# Patient Record
Sex: FEMALE | Race: BLACK OR AFRICAN AMERICAN | Employment: FULL TIME | URBAN - METROPOLITAN AREA
[De-identification: names, ages, dates, MRNs, and addresses within clinical notes are randomized per-mention and may not be internally consistent; named-entity substitution may affect disease eponyms.]

---

## 2021-09-03 PROBLEM — D50.9 IRON DEFICIENCY ANEMIA, UNSPECIFIED: Status: ACTIVE | Noted: 2021-09-03

## 2021-09-03 RX ORDER — EPINEPHRINE 1 MG/ML
0.3 INJECTION, SOLUTION, CONCENTRATE INTRAVENOUS AS NEEDED
Status: CANCELLED | OUTPATIENT
Start: 2021-09-08

## 2021-09-03 RX ORDER — DIPHENHYDRAMINE HYDROCHLORIDE 50 MG/ML
25 INJECTION, SOLUTION INTRAMUSCULAR; INTRAVENOUS AS NEEDED
Status: CANCELLED
Start: 2021-09-08

## 2021-09-03 RX ORDER — ACETAMINOPHEN 325 MG/1
650 TABLET ORAL AS NEEDED
Status: CANCELLED
Start: 2021-09-08

## 2021-09-03 RX ORDER — HYDROCORTISONE SODIUM SUCCINATE 100 MG/2ML
100 INJECTION, POWDER, FOR SOLUTION INTRAMUSCULAR; INTRAVENOUS AS NEEDED
Status: CANCELLED | OUTPATIENT
Start: 2021-09-08

## 2021-09-03 RX ORDER — SODIUM CHLORIDE 9 MG/ML
10 INJECTION INTRAMUSCULAR; INTRAVENOUS; SUBCUTANEOUS AS NEEDED
Status: CANCELLED | OUTPATIENT
Start: 2021-09-08

## 2021-09-03 RX ORDER — HEPARIN 100 UNIT/ML
300-500 SYRINGE INTRAVENOUS AS NEEDED
Status: CANCELLED
Start: 2021-09-08

## 2021-09-03 RX ORDER — ONDANSETRON 2 MG/ML
8 INJECTION INTRAMUSCULAR; INTRAVENOUS AS NEEDED
Status: CANCELLED | OUTPATIENT
Start: 2021-09-08

## 2021-09-03 RX ORDER — DIPHENHYDRAMINE HYDROCHLORIDE 50 MG/ML
50 INJECTION, SOLUTION INTRAMUSCULAR; INTRAVENOUS AS NEEDED
Status: CANCELLED
Start: 2021-09-08

## 2021-09-03 RX ORDER — ALBUTEROL SULFATE 0.83 MG/ML
2.5 SOLUTION RESPIRATORY (INHALATION) AS NEEDED
Status: CANCELLED
Start: 2021-09-08

## 2021-09-08 ENCOUNTER — HOSPITAL ENCOUNTER (OUTPATIENT)
Dept: INFUSION THERAPY | Age: 36
Discharge: HOME | End: 2021-09-08
Payer: COMMERCIAL

## 2021-09-08 VITALS
TEMPERATURE: 99.2 F | OXYGEN SATURATION: 90 % | SYSTOLIC BLOOD PRESSURE: 136 MMHG | HEART RATE: 80 BPM | DIASTOLIC BLOOD PRESSURE: 84 MMHG | RESPIRATION RATE: 16 BRPM

## 2021-09-08 DIAGNOSIS — D50.9 IRON DEFICIENCY ANEMIA, UNSPECIFIED IRON DEFICIENCY ANEMIA TYPE: Primary | ICD-10-CM

## 2021-09-08 PROCEDURE — 74011000258 HC RX REV CODE- 258: Performed by: FAMILY MEDICINE

## 2021-09-08 PROCEDURE — 96375 TX/PRO/DX INJ NEW DRUG ADDON: CPT

## 2021-09-08 PROCEDURE — 74011250636 HC RX REV CODE- 250/636: Performed by: NURSE PRACTITIONER

## 2021-09-08 PROCEDURE — 74011250637 HC RX REV CODE- 250/637: Performed by: FAMILY MEDICINE

## 2021-09-08 PROCEDURE — 96366 THER/PROPH/DIAG IV INF ADDON: CPT

## 2021-09-08 PROCEDURE — 96365 THER/PROPH/DIAG IV INF INIT: CPT

## 2021-09-08 PROCEDURE — 74011250636 HC RX REV CODE- 250/636: Performed by: FAMILY MEDICINE

## 2021-09-08 RX ORDER — ACETAMINOPHEN 325 MG/1
650 TABLET ORAL ONCE
Status: CANCELLED
Start: 2021-09-08 | End: 2021-09-08

## 2021-09-08 RX ORDER — HEPARIN 100 UNIT/ML
300-500 SYRINGE INTRAVENOUS AS NEEDED
Status: CANCELLED
Start: 2021-09-08

## 2021-09-08 RX ORDER — ONDANSETRON 2 MG/ML
8 INJECTION INTRAMUSCULAR; INTRAVENOUS AS NEEDED
Status: CANCELLED | OUTPATIENT
Start: 2021-09-08

## 2021-09-08 RX ORDER — ACETAMINOPHEN 325 MG/1
650 TABLET ORAL AS NEEDED
Status: CANCELLED
Start: 2021-09-08

## 2021-09-08 RX ORDER — SODIUM CHLORIDE 0.9 % (FLUSH) 0.9 %
10 SYRINGE (ML) INJECTION AS NEEDED
Status: CANCELLED | OUTPATIENT
Start: 2021-09-08

## 2021-09-08 RX ORDER — DIPHENHYDRAMINE HYDROCHLORIDE 50 MG/ML
25 INJECTION, SOLUTION INTRAMUSCULAR; INTRAVENOUS AS NEEDED
Status: CANCELLED
Start: 2021-09-08

## 2021-09-08 RX ORDER — DIPHENHYDRAMINE HYDROCHLORIDE 50 MG/ML
25 INJECTION, SOLUTION INTRAMUSCULAR; INTRAVENOUS ONCE
Status: COMPLETED | OUTPATIENT
Start: 2021-09-08 | End: 2021-09-08

## 2021-09-08 RX ORDER — ALBUTEROL SULFATE 0.83 MG/ML
2.5 SOLUTION RESPIRATORY (INHALATION) AS NEEDED
Status: CANCELLED
Start: 2021-09-08

## 2021-09-08 RX ORDER — SODIUM CHLORIDE 0.9 % (FLUSH) 0.9 %
10 SYRINGE (ML) INJECTION AS NEEDED
Status: DISPENSED | OUTPATIENT
Start: 2021-09-08 | End: 2021-09-08

## 2021-09-08 RX ORDER — HYDROCORTISONE SODIUM SUCCINATE 100 MG/2ML
100 INJECTION, POWDER, FOR SOLUTION INTRAMUSCULAR; INTRAVENOUS AS NEEDED
Status: CANCELLED | OUTPATIENT
Start: 2021-09-08

## 2021-09-08 RX ORDER — DIPHENHYDRAMINE HYDROCHLORIDE 50 MG/ML
50 INJECTION, SOLUTION INTRAMUSCULAR; INTRAVENOUS AS NEEDED
Status: CANCELLED
Start: 2021-09-08

## 2021-09-08 RX ORDER — SODIUM CHLORIDE 9 MG/ML
10 INJECTION INTRAMUSCULAR; INTRAVENOUS; SUBCUTANEOUS AS NEEDED
Status: CANCELLED | OUTPATIENT
Start: 2021-09-08

## 2021-09-08 RX ORDER — SODIUM CHLORIDE 9 MG/ML
25 INJECTION, SOLUTION INTRAVENOUS CONTINUOUS
Status: CANCELLED | OUTPATIENT
Start: 2021-09-08

## 2021-09-08 RX ORDER — ACETAMINOPHEN 325 MG/1
650 TABLET ORAL ONCE
Status: COMPLETED | OUTPATIENT
Start: 2021-09-08 | End: 2021-09-08

## 2021-09-08 RX ORDER — SODIUM CHLORIDE 9 MG/ML
25 INJECTION, SOLUTION INTRAVENOUS CONTINUOUS
Status: DISPENSED | OUTPATIENT
Start: 2021-09-08 | End: 2021-09-08

## 2021-09-08 RX ORDER — EPINEPHRINE 1 MG/ML
0.3 INJECTION, SOLUTION, CONCENTRATE INTRAVENOUS AS NEEDED
Status: CANCELLED | OUTPATIENT
Start: 2021-09-08

## 2021-09-08 RX ADMIN — SODIUM CHLORIDE 975 MG: 0.9 INJECTION, SOLUTION INTRAVENOUS at 10:37

## 2021-09-08 RX ADMIN — SODIUM CHLORIDE 25 ML/HR: 0.9 INJECTION, SOLUTION INTRAVENOUS at 09:22

## 2021-09-08 RX ADMIN — SODIUM CHLORIDE 25 MG: 9 INJECTION, SOLUTION INTRAVENOUS at 09:34

## 2021-09-08 RX ADMIN — DIPHENHYDRAMINE HYDROCHLORIDE 25 MG: 50 INJECTION INTRAMUSCULAR; INTRAVENOUS at 09:19

## 2021-09-08 RX ADMIN — ACETAMINOPHEN 650 MG: 325 TABLET ORAL at 09:14

## 2021-09-08 RX ADMIN — Medication 10 ML: at 14:43

## 2021-09-08 NOTE — PROGRESS NOTES
[facility] Progress Note    Date: 2979    Name: Blayne Castellanos    MRN: [id number]         : 1985    INFED    Ms. Binta Yin was assessed and education was provided. Infed education completed with patient, all questions answered and patient verbalizes understanding of possible risks, benefits, side effects, and s/s of an allergic reaction. Ms. Emani Gordon vitals were reviewed and patient was observed for 5 minutes prior to treatment. Visit Vitals  /81   Pulse 89   Temp 99.5 °F (37.5 °C)   Resp 16   SpO2 98%   Breastfeeding No       PIV inserted x1 attempt in  LAC , flushes easily with brisk blood return. Pre-medications of tylenol 650 mg PO and benadryl 25 mg IV administered per order. Ms. Binta Yin tolerated the infusion, and had no complaints. Infed test dose 75 mg administered over 30 minutes. Pt tolerates well and has no complaints. One hour later, full infed dose of 975 mg IV initiated and infused over 4 hours per order. Patient tolerates well. VS remain stable. Patient observed for 30 minutes post infusion without any complaints or s/s of an allergic reaction. VS remain stable. PIV flushed with NS and removed per protocol, gauze and coban applied. Patient armband removed and shredded. Ms. Binta Yin was discharged from [place] [address] in stable condition at 1500. She is to return as advised by MD for her next appointment; no further appointments at this time.     Narayan Willis RN  2021  2:11 PM

## 2022-03-19 PROBLEM — D50.9 IRON DEFICIENCY ANEMIA, UNSPECIFIED: Status: ACTIVE | Noted: 2021-09-03

## 2024-02-27 ENCOUNTER — HOSPITAL ENCOUNTER (OUTPATIENT)
Facility: HOSPITAL | Age: 39
Setting detail: RECURRING SERIES
Discharge: HOME | End: 2024-03-01
Payer: COMMERCIAL

## 2024-02-27 PROCEDURE — 97161 PT EVAL LOW COMPLEX 20 MIN: CPT

## 2024-02-27 NOTE — PROGRESS NOTES
uncomplicated  ;Clinical Decision Making LOW Complexity : FOTO score of  FOTO score = an established functional score where 100 = no disability  Overall Complexity Rating: LOW     Problem List: Pelvic pain/dysfunction, Decreased pelvic floor mm awareness, Use of accessory muscles, Hypertonus of pelvic floor, and Other  Treatment Plan may include any combination of the followin Therapeutic Exercise, 64625 Neuromuscular Re-Education, 77531 Manual Therapy, 35843 Therapeutic Activity, and 95017 Self Care/Home Management  Patient / Family readiness to learn indicated by: asking questions, interest, and return verbalization   Persons(s) to be included in education: patient (P)  Barriers to Learning/Limitations: yes;  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    Status at evaluation: dilator training to increase PFM awareness prescribed, with emphasis on \"basement\" of elevator/basement exercise, fiber handout     *Bowel: Patient will report successful implementation of bowel routine including increasing water intake to 64 ounces and increasing fiber to improve bowel regularity to 1 bowel movement daily. .   Status at evaluation: patient currently drinks 32 ounces water and some fiber, reporting bowel movement 1x/ 2-3 days     * Patient demonstrate improved PFM ROM and coordination with ability to \"find\" PFMs with palpably active contract/ kegel and active \"bulge\"               Status at evaluation:   Ability to perform PFM contraction: able to contract 
therapy.    Status at evaluation: dilator training to increase PFM awareness prescribed, with emphasis on \"basement\" of elevator/basement exercise, fiber handout    *Bowel: Patient will report successful implementation of bowel routine including increasing water intake to 64 ounces and increasing fiber to improve bowel regularity to 1 bowel movement daily. .   Status at evaluation: patient currently drinks 32 ounces water and some fiber, reporting bowel movement 1x/ 2-3 days    * Patient demonstrate improved PFM ROM and coordination with ability to \"find\" PFMs with palpably active contract/ kegel and active \"bulge\"    Status at evaluation:   Ability to perform PFM contraction: able to contract superficial PFMs (with cue to shut off urine flow), but unable to contract levator ani  Ability to actively bulge: unable   PFM ROM: none- unable to \"find\" PFMs to contract or bulge    Long term goals: To be achieved in 8 treatments:   *PFMs: Patient will improve pelvic floor muscle tone to \"normal\" and tolerate medium-sized dilator during training with pain 1/10 or less to tolerate pelvic floor muscle exams.   Status at evaluation: moderate tone of levator ani muscles- able to insert small index finger up to second knuckle to mild discomfort, but high circumferential tone      *PFMs: Patient be able to contract pelvic floor muscles during exercise with full relaxation following to prevent return of symptoms and demonstrate ability to manage symptoms long-term.  Status at evaluation: PFM ROM: none- unable to \"find\" PFMs to contract or bulge; increased tone    * Function Patient will demonstrate improvement of current complaints evidenced by an improvement in FOTO PFDI Pain score to 6 points .  Status at evaluation: FOTO PFDI Pain score: 17    PLAN  []  Upgrade activities as tolerated     [x]  Continue plan of care  []  Update interventions per flow sheet       []  Discharge due to:_  []  Other:_      Raman Murillo

## 2024-03-11 ENCOUNTER — TELEPHONE (OUTPATIENT)
Facility: HOSPITAL | Age: 39
End: 2024-03-11

## 2024-03-11 NOTE — TELEPHONE ENCOUNTER
Spoke with patient, she would like to be placed on 30 day hold. We are going to keep the appts from April 3rd on, we will call her when we get closer to the hold date to see if she would like to continue.

## 2024-04-03 ENCOUNTER — HOSPITAL ENCOUNTER (OUTPATIENT)
Facility: HOSPITAL | Age: 39
Setting detail: RECURRING SERIES
Discharge: HOME | End: 2024-04-06
Payer: COMMERCIAL

## 2024-04-03 PROCEDURE — 97535 SELF CARE MNGMENT TRAINING: CPT

## 2024-04-03 PROCEDURE — 97110 THERAPEUTIC EXERCISES: CPT

## 2024-04-03 PROCEDURE — 97112 NEUROMUSCULAR REEDUCATION: CPT

## 2024-04-03 NOTE — PROGRESS NOTES
PM    Future Appointments   Date Time Provider Department Center   4/3/2024  5:50 PM Sita Banegas, PT [facility]   4/10/2024  5:50 PM Sita Banegas, PT [facility]   4/17/2024  5:50 PM Sita Banegas, PT [facility]

## 2024-04-10 ENCOUNTER — HOSPITAL ENCOUNTER (OUTPATIENT)
Facility: HOSPITAL | Age: 39
Setting detail: RECURRING SERIES
Discharge: HOME | End: 2024-04-13
Payer: COMMERCIAL

## 2024-04-10 PROCEDURE — 97110 THERAPEUTIC EXERCISES: CPT

## 2024-04-10 PROCEDURE — 97530 THERAPEUTIC ACTIVITIES: CPT

## 2024-04-10 PROCEDURE — 97112 NEUROMUSCULAR REEDUCATION: CPT

## 2024-04-10 NOTE — PROGRESS NOTES
[facility]  [address] [place] [address]  Ph [phone]  Fx [phone]    Physical Therapy Progress Note  Patient name: Sandra Segovia Start of Care: 2024   Referral source: Bret Gar* : 1985               Medical Diagnosis: Other symptoms and signs involving the genitourinary system [R39.89]    Onset Date:2021               Treatment Diagnosis: Other symptoms and signs involving the genitourinary system [R39.89]   Prior Hospitalization: see medical history Provider#: [id number]   Medications: Verified on Patient summary List    Comorbidities: uterine fibroids   Prior Level of Function: no pain with pelvic exams or intercourse     Visits from Start of Care: 3    Missed Visits: 0    Updated Goals/Measure of Progress: To be achieved in 9 treatments:    Short term goals: To be achieved in 4 treatments:  *Patient will report adherence to HEP as recommended for active participation and progression of interventions during therapy.    Status at evaluation: dilator training to increase PFM awareness prescribed, with emphasis on \"basement\" of elevator/basement exercise, fiber handout  PN:  pt reports compliance with HEP.  Patient reports trying to use the \"basement\" to help with bulge. Progressing  4/10/2024     *Bowel: Patient will report successful implementation of bowel routine including increasing water intake to 64 ounces and increasing fiber to improve bowel regularity to 1 bowel movement daily. .   Status at evaluation: patient currently drinks 32 ounces water and some fiber, reporting bowel movement 1x/ 2-3 days  PN:  Pt reports consuming about 36 ounces of water.  Patient was using honey as a laxative - was having a bowel movement almost daily (ran out of honey).  Progressing      * Patient demonstrate improved PFM ROM and coordination with ability to \"find\" PFMs with palpably active contract/ kegel and active \"bulge\"               
circumferential tone   PN:  Pt has dilators but they are in storage.  Discussed different dilator companies that sell individual dilators.  Progressing 4/10/2024      *PFMs: Patient be able to contract pelvic floor muscles during exercise with full relaxation following to prevent return of symptoms and demonstrate ability to manage symptoms long-term.  Status at evaluation: PFM ROM: none- unable to \"find\" PFMs to contract or bulge; increased tone  PN:  Tested at the ischiorectal fossa - pt was able to perform bulge and pelvic floor contraction.  Progressing 4/10/2024     * Function Patient will demonstrate improvement of current complaints evidenced by an improvement in FOTO PFDI Pain score to 6 points .  Status at evaluation: FOTO PFDI Pain score: 17  PN:  Will test at the 5th visit.  4/10/2024    PLAN  Yes  Continue plan of care  []  Upgrade activities as tolerated  []  Discharge due to :  []  Other:    Sita Banegas PT    4/10/2024    12:27 PM    Future Appointments   Date Time Provider Department Center   4/10/2024  5:50 PM Sita Banegas PT [facility]   4/17/2024  5:50 PM Sita Banegas PT [facility]

## 2024-04-17 ENCOUNTER — HOSPITAL ENCOUNTER (OUTPATIENT)
Facility: HOSPITAL | Age: 39
Setting detail: RECURRING SERIES
Discharge: HOME | End: 2024-04-20
Payer: COMMERCIAL

## 2024-04-17 PROCEDURE — 97530 THERAPEUTIC ACTIVITIES: CPT

## 2024-04-17 PROCEDURE — 97110 THERAPEUTIC EXERCISES: CPT

## 2024-04-17 PROCEDURE — 97112 NEUROMUSCULAR REEDUCATION: CPT

## 2024-04-17 NOTE — PROGRESS NOTES
PHYSICAL / OCCUPATIONAL THERAPY - DAILY TREATMENT NOTE     Patient Name: Sandra Segovia    Date: 2024    : 1985  Insurance: Payor: [employer] / Plan: [employer] / Product Type: *No Product type* /      Patient  verified Yes     Visit #   Current / Total 4 8   Time   In / Out [id number]   Pain   In / Out 0 0   Subjective Functional Status/Changes: Pt reports that she may buy dilators   Changes to:  Allergies, Med Hx, Sx Hx?   no       TREATMENT AREA =  Other symptoms and signs involving the genitourinary system [R39.89]    OBJECTIVE          Therapeutic Procedures:  Tx Min Billable or 1:1 Min (if diff from Tx Min) Procedure, Rationale, Specifics   30 30 79322 Therapeutic Exercise (timed):  increase ROM, strength, coordination, balance, and proprioception to improve patient's ability to progress to PLOF and address remaining functional goals. (see flow sheet as applicable)    Details if applicable:       10 10 67649 Neuromuscular Re-Education (timed):  improve balance, coordination, kinesthetic sense, posture, core stability and proprioception to improve patient's ability to develop conscious control of individual muscles and awareness of position of extremities in order to progress to PLOF and address remaining functional goals. (see flow sheet as applicable)    Details if applicable:            Details if applicable:           Details if applicable:            Details if applicable:     40 40 [facility] Totals Reminder: bill using total billable min of TIMED therapeutic procedures (example: do not include dry needle or estim unattended, both untimed codes, in totals to left)  8-22 min = 1 unit; 23-37 min = 2 units; 38-52 min = 3 units; 53-67 min = 4 units; 68-82 min = 5 units   Total Total     TOTAL TREATMENT TIME:        40     [x]  Patient Education billed concurrently with other procedures   [x] Review HEP    [] Progressed/Changed HEP, detail:    [] Other detail:       Objective

## 2024-04-25 ENCOUNTER — APPOINTMENT (OUTPATIENT)
Facility: HOSPITAL | Age: 39
End: 2024-04-25
Payer: COMMERCIAL

## 2024-04-25 ENCOUNTER — HOSPITAL ENCOUNTER (OUTPATIENT)
Facility: HOSPITAL | Age: 39
Setting detail: RECURRING SERIES
Discharge: HOME | End: 2024-04-28
Payer: COMMERCIAL

## 2024-04-25 PROCEDURE — 97530 THERAPEUTIC ACTIVITIES: CPT

## 2024-04-25 PROCEDURE — 97112 NEUROMUSCULAR REEDUCATION: CPT

## 2024-04-25 NOTE — PROGRESS NOTES
PHYSICAL / OCCUPATIONAL THERAPY - DAILY TREATMENT NOTE     Patient Name: Sandra Segovia    Date: 2024    : 1985  Insurance: Payor: [employer] / Plan: [employer] / Product Type: *No Product type* /      Patient  verified Yes     Visit #   Current / Total 5 8   Time   In / Out 5:19 5:52   Pain   In / Out 0 0   Subjective Functional Status/Changes: Patient reports no new complaints   Changes to:  Allergies, Med Hx, Sx Hx?   no       TREATMENT AREA =  Other symptoms and signs involving the genitourinary system [R39.89]    OBJECTIVE      Therapeutic Procedures:  Tx Min Billable or 1:1 Min (if diff from Tx Min) Procedure, Rationale, Specifics   18  00665 Neuromuscular Re-Education (timed):  improve balance, coordination, kinesthetic sense, posture, core stability and proprioception to improve patient's ability to develop conscious control of individual muscles and awareness of position of extremities in order to progress to PLOF and address remaining functional goals. (see flow sheet as applicable)    Details if applicable:     15  46192 Therapeutic Activity (timed):  use of dynamic activities replicating functional movements to increase ROM, strength, coordination, balance, and proprioception in order to improve patient's ability to progress to PLOF and address remaining functional goals.  (see flow sheet as applicable)     Details if applicable:           Details if applicable:            Details if applicable:     33  [facility] Totals Reminder: bill using total billable min of TIMED therapeutic procedures (example: do not include dry needle or estim unattended, both untimed codes, in totals to left)  8-22 min = 1 unit; 23-37 min = 2 units; 38-52 min = 3 units; 53-67 min = 4 units; 68-82 min = 5 units   Total Total     TOTAL TREATMENT TIME:        33     [x]  Patient Education billed concurrently with other procedures   [x] Review HEP    [] Progressed/Changed HEP, detail:    [] Other detail:

## 2024-05-24 ENCOUNTER — TELEPHONE (OUTPATIENT)
Facility: HOSPITAL | Age: 39
End: 2024-05-24